# Patient Record
Sex: FEMALE | Race: WHITE | Employment: UNEMPLOYED | URBAN - METROPOLITAN AREA
[De-identification: names, ages, dates, MRNs, and addresses within clinical notes are randomized per-mention and may not be internally consistent; named-entity substitution may affect disease eponyms.]

---

## 2023-09-06 DIAGNOSIS — N32.9: Primary | ICD-10-CM

## 2023-09-07 ENCOUNTER — OFFICE VISIT (OUTPATIENT)
Dept: UROLOGY | Facility: CLINIC | Age: 58
End: 2023-09-07
Payer: COMMERCIAL

## 2023-09-07 DIAGNOSIS — N81.10 BLADDER PROLAPSE, FEMALE, ACQUIRED: Primary | ICD-10-CM

## 2023-09-07 DIAGNOSIS — N32.9: ICD-10-CM

## 2023-09-07 DIAGNOSIS — B37.9 YEAST INFECTION: ICD-10-CM

## 2023-09-07 DIAGNOSIS — N30.00 ACUTE CYSTITIS WITHOUT HEMATURIA: ICD-10-CM

## 2023-09-07 LAB
BILIRUBIN, UA POC OHS: NEGATIVE
BLOOD, UA POC OHS: NEGATIVE
CLARITY, UA POC OHS: ABNORMAL
COLOR, UA POC OHS: YELLOW
GLUCOSE, UA POC OHS: NEGATIVE
KETONES, UA POC OHS: NEGATIVE
LEUKOCYTES, UA POC OHS: ABNORMAL
NITRITE, UA POC OHS: NEGATIVE
PH, UA POC OHS: 6
PROTEIN, UA POC OHS: NEGATIVE
SPECIFIC GRAVITY, UA POC OHS: 1.01
UROBILINOGEN, UA POC OHS: 0.2

## 2023-09-07 PROCEDURE — 81003 URINALYSIS AUTO W/O SCOPE: CPT | Mod: QW,S$GLB,, | Performed by: FAMILY MEDICINE

## 2023-09-07 PROCEDURE — 99214 PR OFFICE/OUTPT VISIT, EST, LEVL IV, 30-39 MIN: ICD-10-PCS | Mod: S$GLB,,, | Performed by: FAMILY MEDICINE

## 2023-09-07 PROCEDURE — 1160F RVW MEDS BY RX/DR IN RCRD: CPT | Mod: CPTII,S$GLB,, | Performed by: FAMILY MEDICINE

## 2023-09-07 PROCEDURE — 99214 OFFICE O/P EST MOD 30 MIN: CPT | Mod: S$GLB,,, | Performed by: FAMILY MEDICINE

## 2023-09-07 PROCEDURE — 1160F PR REVIEW ALL MEDS BY PRESCRIBER/CLIN PHARMACIST DOCUMENTED: ICD-10-PCS | Mod: CPTII,S$GLB,, | Performed by: FAMILY MEDICINE

## 2023-09-07 PROCEDURE — 1159F PR MEDICATION LIST DOCUMENTED IN MEDICAL RECORD: ICD-10-PCS | Mod: CPTII,S$GLB,, | Performed by: FAMILY MEDICINE

## 2023-09-07 PROCEDURE — 81003 POCT URINALYSIS(INSTRUMENT): ICD-10-PCS | Mod: QW,S$GLB,, | Performed by: FAMILY MEDICINE

## 2023-09-07 PROCEDURE — 1159F MED LIST DOCD IN RCRD: CPT | Mod: CPTII,S$GLB,, | Performed by: FAMILY MEDICINE

## 2023-09-07 RX ORDER — ALBUTEROL SULFATE 0.83 MG/ML
SOLUTION RESPIRATORY (INHALATION)
COMMUNITY
Start: 2023-03-21

## 2023-09-07 RX ORDER — CLONAZEPAM 1 MG/1
TABLET ORAL
COMMUNITY
Start: 2023-08-16

## 2023-09-07 RX ORDER — TITANIUM DIOXIDE, OCTINOXATE, ZINC OXIDE 4.61; 1.6; .78 G/40ML; G/40ML; G/40ML
CREAM TOPICAL
COMMUNITY

## 2023-09-07 RX ORDER — CLOZAPINE 100 MG/1
TABLET ORAL
COMMUNITY
Start: 2023-08-15

## 2023-09-07 RX ORDER — FLUCONAZOLE 150 MG/1
150 TABLET ORAL DAILY
Qty: 2 TABLET | Refills: 0 | Status: SHIPPED | OUTPATIENT
Start: 2023-09-07 | End: 2023-09-09

## 2023-09-07 RX ORDER — ZOLPIDEM TARTRATE 5 MG/1
TABLET ORAL
COMMUNITY
Start: 2023-05-11

## 2023-09-07 RX ORDER — CHOLECALCIFEROL (VITAMIN D3) 25 MCG
1000 TABLET ORAL DAILY
COMMUNITY

## 2023-09-07 NOTE — PROGRESS NOTES
Subjective:       Patient ID: Loretta Thomas is a 58 y.o. female.    Chief Complaint: bladder prolapse      HPI: 58-year-old female patient presenting to the clinic to establish care for possible pelvic organ prolapse.  Patient also states that she gets frequent urinary tract infections.  Today she complains of mostly itching in the vaginal area.  She denies any dysuria.  She recently finished antibiotic for UTI.          Past Medical History:   Past Medical History:   Diagnosis Date    Bipolar disorder, unspecified     Schizophrenia, unspecified        Past Surgical Historical:   Past Surgical History:   Procedure Laterality Date    DILATION AND CURETTAGE OF UTERUS      fibroid removal      SURGICAL REMOVAL OF ENDOMETRIOSIS          Medications:   Medication List with Changes/Refills   New Medications    FLUCONAZOLE (DIFLUCAN) 150 MG TAB    Take 1 tablet (150 mg total) by mouth once daily. One by mouth now and repeat x1 on day 3 for 2 doses   Current Medications    ALBUTEROL (PROVENTIL) 2.5 MG /3 ML (0.083 %) NEBULIZER SOLUTION        CLONAZEPAM (KLONOPIN) 1 MG TABLET        CLOZAPINE (CLOZARIL) 100 MG TAB        CRANBERRY 400 MG CAP    Take by mouth.    NAPROXEN SODIUM (ALEVE ORAL)    Take by mouth.    VITAMIN D (VITAMIN D3) 1000 UNITS TAB    Take 1,000 Units by mouth once daily.    ZOLPIDEM (AMBIEN) 5 MG TAB            Past Social History:   Social History     Socioeconomic History    Marital status:    Tobacco Use    Smoking status: Never    Smokeless tobacco: Never   Substance and Sexual Activity    Alcohol use: Never    Drug use: Never       Allergies:   Review of patient's allergies indicates:   Allergen Reactions    Aspirin     Haldol [haloperidol lactate]         Family History: History reviewed. No pertinent family history.     Review of Systems:  Review of Systems   Constitutional:  Negative for activity change, appetite change, chills, diaphoresis, fatigue, fever and unexpected weight change.    HENT:  Negative for congestion, dental problem, drooling, ear discharge, ear pain, facial swelling, hearing loss, mouth sores, nosebleeds, postnasal drip, rhinorrhea, sinus pressure, sinus pain, sneezing, sore throat, tinnitus, trouble swallowing and voice change.    Eyes:  Negative for photophobia, pain, discharge, redness, itching and visual disturbance.   Respiratory:  Negative for apnea, cough, choking, chest tightness, shortness of breath, wheezing and stridor.    Cardiovascular:  Negative for chest pain and leg swelling.   Gastrointestinal:  Negative for abdominal distention, abdominal pain, anal bleeding, blood in stool, constipation, diarrhea, nausea, rectal pain and vomiting.   Endocrine: Negative for cold intolerance, heat intolerance, polydipsia, polyphagia and polyuria.   Genitourinary: Negative.  Negative for decreased urine volume, difficulty urinating, dyspareunia, dysuria, enuresis, flank pain, frequency, genital sores, hematuria, menstrual problem, pelvic pain, urgency, vaginal bleeding, vaginal discharge and vaginal pain.   Musculoskeletal:  Negative for arthralgias, back pain, gait problem, joint swelling, myalgias, neck pain and neck stiffness.   Skin:  Negative for color change, pallor, rash and wound.   Allergic/Immunologic: Negative for environmental allergies, food allergies and immunocompromised state.   Neurological:  Negative for dizziness, tremors, seizures, syncope, facial asymmetry, speech difficulty, weakness, light-headedness, numbness and headaches.   Hematological:  Negative for adenopathy. Does not bruise/bleed easily.   Psychiatric/Behavioral:  Negative for agitation, behavioral problems, confusion, decreased concentration, dysphoric mood, hallucinations, self-injury, sleep disturbance and suicidal ideas. The patient is not nervous/anxious and is not hyperactive.        Physical Exam:  Physical Exam  Exam conducted with a chaperone present.   Constitutional:       Appearance:  Normal appearance.   HENT:      Head: Normocephalic.      Nose: Nose normal.      Mouth/Throat:      Mouth: Mucous membranes are moist.      Pharynx: Oropharynx is clear.   Eyes:      Extraocular Movements: Extraocular movements intact.      Conjunctiva/sclera: Conjunctivae normal.      Pupils: Pupils are equal, round, and reactive to light.   Cardiovascular:      Rate and Rhythm: Normal rate and regular rhythm.      Pulses: Normal pulses.      Heart sounds: Normal heart sounds.   Pulmonary:      Effort: Pulmonary effort is normal.      Breath sounds: Normal breath sounds.   Abdominal:      General: Abdomen is flat. Bowel sounds are normal.      Palpations: Abdomen is soft.      Hernia: There is no hernia in the left inguinal area or right inguinal area.   Genitourinary:     General: Normal vulva.      Pubic Area: No rash or pubic lice.       Labia:         Right: No rash, tenderness, lesion or injury.         Left: No rash, tenderness, lesion or injury.       Urethra: No prolapse, urethral pain, urethral swelling or urethral lesion.      Rectum: Normal.   Musculoskeletal:         General: Normal range of motion.      Cervical back: Normal range of motion and neck supple.   Lymphadenopathy:      Lower Body: No right inguinal adenopathy. No left inguinal adenopathy.   Skin:     General: Skin is warm and dry.      Capillary Refill: Capillary refill takes less than 2 seconds.   Neurological:      General: No focal deficit present.      Mental Status: She is alert and oriented to person, place, and time. Mental status is at baseline.   Psychiatric:         Mood and Affect: Mood normal.         Behavior: Behavior normal.         Thought Content: Thought content normal.         Judgment: Judgment normal.         Assessment/Plan:     Pelvic exam done with no obvious pelvic organ prolapse at this time.  Patient has not had a hysterectomy.  She states that she feels vaginal pressure and a bulge when coughing.  We will refer  the patient to an OBGYN for further evaluation as we can not proceed with surgical intervention without hysterectomy.  Diflucan will be prescribed for vaginal yeast infection.  Urinalysis negative for infection today.  Bladder scan was 0 mL postvoid  Problem List Items Addressed This Visit    None  Visit Diagnoses       Bladder prolapse, female, acquired    -  Primary    Relevant Orders    Ambulatory referral/consult to Obstetrics / Gynecology    Problem with bladder        Yeast infection        Relevant Medications    fluconazole (DIFLUCAN) 150 MG Tab

## 2024-06-07 DIAGNOSIS — R92.8 ABNORMAL MAMMOGRAM OF LEFT BREAST: Primary | ICD-10-CM

## 2024-06-10 ENCOUNTER — OFFICE VISIT (OUTPATIENT)
Dept: SURGERY | Facility: CLINIC | Age: 59
End: 2024-06-10
Payer: COMMERCIAL

## 2024-06-10 DIAGNOSIS — N63.21 MASS OF UPPER OUTER QUADRANT OF LEFT BREAST: Primary | ICD-10-CM

## 2024-06-10 PROCEDURE — 99203 OFFICE O/P NEW LOW 30 MIN: CPT | Mod: S$GLB,,, | Performed by: SURGERY

## 2024-06-10 RX ORDER — ATORVASTATIN CALCIUM 40 MG/1
40 TABLET, FILM COATED ORAL NIGHTLY
COMMUNITY
Start: 2024-05-28

## 2024-06-10 RX ORDER — LURASIDONE HYDROCHLORIDE 60 MG/1
60 TABLET, FILM COATED ORAL DAILY
COMMUNITY
Start: 2024-05-20

## 2024-06-10 RX ORDER — BISACODYL 5 MG
5 TABLET, DELAYED RELEASE (ENTERIC COATED) ORAL DAILY PRN
COMMUNITY

## 2024-06-10 NOTE — PROGRESS NOTES
History & Physical    SUBJECTIVE:     History of Present Illness:    59-year-old female referred by Dr Hernandez for a palpable left breast lump for proximally 1 week.  She went on to have mammogram and ultrasound which showed an irregular left breast mass measuring 2.9 cm with a large focal asymmetry in the upper outer quadrant of the left breast highly suggestive of malignancy.  Ultrasound-guided biopsy is recommended.  She also had a loosely grouped area of pleomorphic calcifications in the anterior upper outer aspect of left breast that were also suspicious of malignancy and stereotactic biopsy was recommended.  No family history of breast cancer.  No previous breast biopsies.  She had 1 child and did not breastfeed.    Chief Complaint   Patient presents with    Breast Problem     Left breast         Review of patient's allergies indicates:  Review of patient's allergies indicates:   Allergen Reactions    Aspirin     Haldol [haloperidol lactate]     Tylenol [acetaminophen] Diarrhea       Current Outpatient Medications on File Prior to Visit   Medication Sig Dispense Refill    albuterol (PROVENTIL) 2.5 mg /3 mL (0.083 %) nebulizer solution       atorvastatin (LIPITOR) 40 MG tablet Take 40 mg by mouth every evening.      bisacodyL (DULCOLAX) 5 mg EC tablet Take 5 mg by mouth daily as needed for Constipation.      clonazePAM (KLONOPIN) 1 MG tablet       cloZAPine (CLOZARIL) 100 MG Tab       cranberry 400 mg Cap Take by mouth.      lurasidone (LATUDA) 60 mg Tab tablet Take 60 mg by mouth once daily.      naproxen sodium (ALEVE ORAL) Take by mouth.      vitamin D (VITAMIN D3) 1000 units Tab Take 1,000 Units by mouth once daily.      zolpidem (AMBIEN) 5 MG Tab        No current facility-administered medications on file prior to visit.       Past Medical History:   Diagnosis Date    Bipolar disorder, unspecified     Schizophrenia, unspecified      Past Surgical History:   Procedure Laterality Date    DILATION AND  CURETTAGE OF UTERUS      fibroid removal      SURGICAL REMOVAL OF ENDOMETRIOSIS       No family history on file.    Social History     Socioeconomic History    Marital status:    Tobacco Use    Smoking status: Never    Smokeless tobacco: Never   Substance and Sexual Activity    Alcohol use: Never    Drug use: Never          ROS    OBJECTIVE:     There were no vitals filed for this visit.              Physical Exam:  Physical Exam  Constitutional:       Appearance: She is obese.   HENT:      Head: Normocephalic.   Eyes:      Pupils: Pupils are equal, round, and reactive to light.   Cardiovascular:      Rate and Rhythm: Normal rate.   Chest:          Comments: Left breast: As a large area of hyperdensity in the breast tissues in the upper outer quadrant region with ill-defined margins.  This is encompasses probably about a 6 x 4 cm area.  Overlying skin normal.  Nipple areola complex normal axilla negative.  Right breast: No abnormal masses palpable.  Skin normal.  Axilla negative.  Nipple areola complex negative  Abdominal:      General: Abdomen is flat. Bowel sounds are normal.      Palpations: Abdomen is soft.   Musculoskeletal:         General: Normal range of motion.   Skin:     General: Skin is warm and dry.   Neurological:      General: No focal deficit present.      Mental Status: She is alert and oriented to person, place, and time.      Cranial Nerves: No cranial nerve deficit.   Psychiatric:         Mood and Affect: Mood normal.         Behavior: Behavior normal.             ASSESSMENT/PLAN:   Suspicious abnormalities in the left breast upper outer quadrant measuring proximally 3 cm on ultrasound as well as some pleomorphic microcalcifications that are also suspicious.  PLAN:  We will obtain ultrasound-guided core biopsy of the upper outer quadrant 2.9 cm breast mass as well as stereotactic biopsy of the calcifications.  Further treatment pending pathological diagnosis

## 2024-06-25 ENCOUNTER — TELEPHONE (OUTPATIENT)
Dept: SURGERY | Facility: CLINIC | Age: 59
End: 2024-06-25
Payer: COMMERCIAL

## 2024-06-25 DIAGNOSIS — C50.912 MALIGNANT NEOPLASM OF LEFT FEMALE BREAST, UNSPECIFIED ESTROGEN RECEPTOR STATUS, UNSPECIFIED SITE OF BREAST: Primary | ICD-10-CM

## 2024-06-25 NOTE — TELEPHONE ENCOUNTER
Patient notified of recent ultrasound and stereotactic left breast biopsy both results showing invasive lobular carcinoma, grade 1 of 3 and grade 2 of 3 respectively.  This area apparently encompasses at least about a 6 cm area in the upper outer left breast so I briefly talked to her about surgical therapy.  We need to await the receptor status to know exactly what to recommend to her at this time.  We will see her next Monday July 1st in office at 1:00 a.m. and hopefully by that time we will have the final path report with the receptors in the HER2 Roland status.

## 2024-07-01 ENCOUNTER — OFFICE VISIT (OUTPATIENT)
Dept: SURGERY | Facility: CLINIC | Age: 59
End: 2024-07-01
Payer: COMMERCIAL

## 2024-07-01 DIAGNOSIS — C50.911 LOBULAR CARCINOMA OF RIGHT BREAST: Primary | ICD-10-CM

## 2024-07-01 DIAGNOSIS — R22.2 SUBCUTANEOUS MASS OF BACK: ICD-10-CM

## 2024-07-01 LAB
ANION GAP SERPL CALC-SCNC: 6 MMOL/L (ref 3–11)
BASOPHILS NFR BLD: 0.4 % (ref 0–3)
BUN SERPL-MCNC: 20 MG/DL (ref 7–18)
BUN/CREAT SERPL: 22.47 RATIO (ref 7–18)
CALCIUM SERPL-MCNC: 8.4 MG/DL (ref 8.8–10.5)
CHLORIDE SERPL-SCNC: 102 MMOL/L (ref 100–108)
CO2 SERPL-SCNC: 33 MMOL/L (ref 21–32)
CREAT SERPL-MCNC: 0.89 MG/DL (ref 0.55–1.02)
EOSINOPHIL NFR BLD: 1 % (ref 1–3)
ERYTHROCYTE [DISTWIDTH] IN BLOOD BY AUTOMATED COUNT: 16.1 % (ref 12.5–18)
GFR ESTIMATION: > 60
GLUCOSE SERPL-MCNC: 80 MG/DL (ref 70–110)
HCT VFR BLD AUTO: 37.1 % (ref 37–47)
HGB BLD-MCNC: 11.5 G/DL (ref 12–16)
LYMPHOCYTES NFR BLD: 19.3 % (ref 25–40)
MCH RBC QN AUTO: 25.6 PG (ref 27–31.2)
MCHC RBC AUTO-ENTMCNC: 31 G/DL (ref 31.8–35.4)
MCV RBC AUTO: 82.4 FL (ref 80–97)
MONOCYTES NFR BLD: 6.9 % (ref 1–15)
NEUTROPHILS # BLD AUTO: 8.3 10*3/UL (ref 1.8–7.7)
NEUTROPHILS NFR BLD: 71.9 % (ref 37–80)
NUCLEATED RED BLOOD CELLS: 0 %
PLATELETS: 255 10*3/UL (ref 142–424)
POTASSIUM SERPL-SCNC: 4.2 MMOL/L (ref 3.6–5.2)
RBC # BLD AUTO: 4.5 10*6/UL (ref 4.2–5.4)
SODIUM BLD-SCNC: 141 MMOL/L (ref 135–145)
WBC # BLD: 11.5 10*3/UL (ref 4.6–10.2)

## 2024-07-01 PROCEDURE — 99213 OFFICE O/P EST LOW 20 MIN: CPT | Mod: S$GLB,,, | Performed by: SURGERY

## 2024-07-01 RX ORDER — LORATADINE 10 MG/1
10 TABLET ORAL DAILY
COMMUNITY

## 2024-07-01 RX ORDER — OMEPRAZOLE 20 MG/1
20 TABLET, DELAYED RELEASE ORAL DAILY
COMMUNITY

## 2024-07-01 RX ORDER — MUPIROCIN 20 MG/G
OINTMENT TOPICAL 2 TIMES DAILY
COMMUNITY
Start: 2024-06-14

## 2024-07-01 RX ORDER — ALUMINUM ZIRCONIUM OCTACHLOROHYDREX GLY 16 G/100G
GEL TOPICAL
COMMUNITY

## 2024-07-01 NOTE — PROGRESS NOTES
History & Physical    SUBJECTIVE:     History of Present Illness:    59-year-old female is here today to discuss recent left breast biopsy results.  She had 2 areas biopsied in the left upper outer quadrant of the breast.  One was a large area of asymmetry measuring about 6 cm with a about a 3 cm mass in the other was an area of microcalcifications.  Both these areas shown to be invasive lobular carcinoma.  ERPR positive HER2 negative.  She is here today to discuss treatment options.    No chief complaint on file.        Review of patient's allergies indicates:  Review of patient's allergies indicates:   Allergen Reactions    Aspirin     Haldol [haloperidol lactate]     Tylenol [acetaminophen] Diarrhea       Current Outpatient Medications on File Prior to Visit   Medication Sig Dispense Refill    mupirocin (BACTROBAN) 2 % ointment 2 (two) times daily. Apply to affected area      albuterol (PROVENTIL) 2.5 mg /3 mL (0.083 %) nebulizer solution       antiox #8/om3/dha/epa/lut/zeax (PRESERVISION AREDS 2, OMEGA-3, ORAL) Take by mouth.      atorvastatin (LIPITOR) 40 MG tablet Take 40 mg by mouth every evening.      Bifidobacterium infantis (ALIGN) 4 mg Cap Take by mouth.      bisacodyL (DULCOLAX) 5 mg EC tablet Take 5 mg by mouth daily as needed for Constipation.      clonazePAM (KLONOPIN) 1 MG tablet       cloZAPine (CLOZARIL) 100 MG Tab       cranberry 400 mg Cap Take by mouth.      loratadine (CLARITIN) 10 mg tablet Take 10 mg by mouth once daily.      lurasidone (LATUDA) 60 mg Tab tablet Take 60 mg by mouth once daily.      naproxen sodium (ALEVE ORAL) Take by mouth.      omeprazole (PRILOSEC OTC) 20 MG tablet Take 20 mg by mouth once daily.      vitamin D (VITAMIN D3) 1000 units Tab Take 1,000 Units by mouth once daily.      [DISCONTINUED] zolpidem (AMBIEN) 5 MG Tab        No current facility-administered medications on file prior to visit.       Past Medical History:   Diagnosis Date    Bipolar disorder, unspecified      Schizophrenia, unspecified      Past Surgical History:   Procedure Laterality Date    DILATION AND CURETTAGE OF UTERUS      fibroid removal      SURGICAL REMOVAL OF ENDOMETRIOSIS       No family history on file.    Social History     Socioeconomic History    Marital status:    Tobacco Use    Smoking status: Never    Smokeless tobacco: Never   Substance and Sexual Activity    Alcohol use: Never    Drug use: Never          Review of Systems   Constitutional: Negative.    Respiratory: Negative.     Cardiovascular: Negative.    Gastrointestinal: Negative.    Genitourinary: Negative.    Musculoskeletal:  Positive for back pain and joint pain.   Neurological:  Positive for weakness.   Psychiatric/Behavioral: Negative.         OBJECTIVE:     There were no vitals filed for this visit.              Physical Exam:  Physical Exam  Constitutional:       Appearance: She is obese.   HENT:      Head: Normocephalic.   Eyes:      Pupils: Pupils are equal, round, and reactive to light.   Cardiovascular:      Rate and Rhythm: Normal rate and regular rhythm.   Pulmonary:      Effort: Pulmonary effort is normal.   Chest:          Comments: Large area in the left upper outer quadrant of the breast measuring about 6 x 5 cm in size with hyperdensity in signs of previous biopsy.  Abdominal:      General: Abdomen is flat. Bowel sounds are normal.      Palpations: Abdomen is soft.   Musculoskeletal:         General: Normal range of motion.      Cervical back: Normal range of motion.   Skin:     General: Skin is warm and dry.             Comments: 5 cm subcutaneous mass right upper back consistent with lipoma.  Smooth edges and rounded   Neurological:      General: No focal deficit present.      Mental Status: She is alert and oriented to person, place, and time.   Psychiatric:         Mood and Affect: Mood normal.         Behavior: Behavior normal.             ASSESSMENT/PLAN:   1. Multifocal lobular invasive carcinoma left breast  with area of asymmetry measuring at least 6 cm.  2. Right upper back subcutaneous mass consistent with lipoma, 5 cm    PLAN:  Discussed with patient treatment options.  I do not think she is a good candidate for breast conservation surgery given the large size of the asymmetry in the left upper outer quadrant and the fact that this invasive lobular carcinomas multifocal.  My recommendation would be for a left total mastectomy with left axillary sentinel lymph node biopsy and then excision of the subcutaneous lipoma from right upper back.  She is in agreement this will be scheduled for next week

## 2024-07-11 ENCOUNTER — OUTSIDE PLACE OF SERVICE (OUTPATIENT)
Dept: SURGERY | Facility: CLINIC | Age: 59
End: 2024-07-11

## 2024-07-11 PROCEDURE — 21931 EXC BACK LES SC 3 CM/>: CPT | Mod: 51,,, | Performed by: SURGERY

## 2024-07-11 PROCEDURE — 38792 RA TRACER ID OF SENTINL NODE: CPT | Mod: XE,LT,, | Performed by: SURGERY

## 2024-07-11 PROCEDURE — 19302 P-MASTECTOMY W/LN REMOVAL: CPT | Mod: LT,,, | Performed by: SURGERY

## 2024-07-11 PROCEDURE — 38900 IO MAP OF SENT LYMPH NODE: CPT | Mod: LT,,, | Performed by: SURGERY

## 2024-07-12 ENCOUNTER — TELEPHONE (OUTPATIENT)
Dept: SURGERY | Facility: CLINIC | Age: 59
End: 2024-07-12
Payer: COMMERCIAL

## 2024-07-12 NOTE — TELEPHONE ENCOUNTER
Rx for zofran 4 mg 1 po q 6 hrs prn nausea #20 called to [facility] pharmacy EDISON Lama.@1215. Patient states that she checked with pharmacy and was informed that nothing was called in. I called the pharmacy and was told that they had nothing for the patient. Informed tech that I called in the Rx at 1215. I was put on hold so that she could check the voicemail. After a long hold, pharmacist came on and confirmed that they did have the voicemail and abruptly ended the call. Patient notified that the pharmacy did have the rx.

## 2024-07-12 NOTE — TELEPHONE ENCOUNTER
Patient called and stated that she changed her mind before she left the hospital and requested norco instead of naproxen. I spoke with Dr. Lama who states that he had a long discussion this morning with the patient who has a tylenol allergy and requested naproxen for pain. He was never notified that she changed her mind. Order received to call out Tramadol 50 mg po q 8 hrs prn pain #20. Called to [facility] [address]. Informed patient that she may want to call and inform pharmacy that rx was left on voicemail as there was an issue with this earlier today. Also discussed with patient the dangers of taking benzodiazepines and narcotics together and instructed to exercise extreme caution until she knows how they will affect her. Do not take at the same time, space at least an hour apart. Patient voiced understanding.

## 2024-07-12 NOTE — TELEPHONE ENCOUNTER
----- Message from Keshav Carcamo sent at 7/12/2024  4:47 PM CDT -----  Contact: SELF  Patient Loretta Thomas is requesting a call back regarding PAIN MEDS. Please call back at [phone]

## 2024-07-12 NOTE — TELEPHONE ENCOUNTER
----- Message from Radha Juan sent at 7/12/2024  2:26 PM CDT -----  Contact: Loretta  Patient is calling to speak with someone regarding prescription. Patient reports pharmacy has not yet received prescription for pain medication and request to confirm status. Please give patient a call back at [phone] today, if possible.    Thank you,  GH

## 2024-07-15 ENCOUNTER — TELEPHONE (OUTPATIENT)
Dept: SURGERY | Facility: CLINIC | Age: 59
End: 2024-07-15
Payer: COMMERCIAL

## 2024-07-17 ENCOUNTER — OFFICE VISIT (OUTPATIENT)
Dept: SURGERY | Facility: CLINIC | Age: 59
End: 2024-07-17
Payer: COMMERCIAL

## 2024-07-17 VITALS — WEIGHT: 293 LBS

## 2024-07-17 DIAGNOSIS — Z98.890 POST-OPERATIVE STATE: Primary | ICD-10-CM

## 2024-07-17 PROCEDURE — 99024 POSTOP FOLLOW-UP VISIT: CPT | Mod: S$GLB,,, | Performed by: SURGERY

## 2024-07-17 NOTE — PROGRESS NOTES
HPI:  Postop left total mastectomy with left axillary sentinel node biopsy and completion left axillary node dissection.  Path report has not returned yet.  MIL outputs are noted in less than 20 cc from drain 1. Which is the breast drain.  There is some leakage around the axillary drain.    PHYSICAL EXAM:  Dressings removed and incisions are clean.  The staple line is intact.  Area of the staples removed and Steri-Strips applied.  The medial drain is removed and it appears to be clogged.  We will leave the axillary drain in.  ASSESSMENT:    Stable postop with path report pending  PLAN:      Have patient return on Monday for removal of the 2nd drain and removal of remaining staples.  Hopefully path report will be back by then.

## 2024-07-22 ENCOUNTER — TELEPHONE (OUTPATIENT)
Dept: HEMATOLOGY/ONCOLOGY | Facility: CLINIC | Age: 59
End: 2024-07-22
Payer: COMMERCIAL

## 2024-07-22 ENCOUNTER — OFFICE VISIT (OUTPATIENT)
Dept: SURGERY | Facility: CLINIC | Age: 59
End: 2024-07-22
Payer: COMMERCIAL

## 2024-07-22 DIAGNOSIS — Z98.890 POST-OPERATIVE STATE: Primary | ICD-10-CM

## 2024-07-22 DIAGNOSIS — C50.911 LOBULAR CARCINOMA OF RIGHT BREAST: ICD-10-CM

## 2024-07-22 PROCEDURE — 99024 POSTOP FOLLOW-UP VISIT: CPT | Mod: S$GLB,,, | Performed by: SURGERY

## 2024-07-22 NOTE — PROGRESS NOTES
HPI:  Second postop revisit status post left total mastectomy with left axillary node dissection.  Final path report shows 3 of 15 lymph nodes positive for metastasis.  Tumor size almost 9 cm.  Invasive lobular carcinoma.    PHYSICAL EXAM:  Remaining staples were removed.  Remaining Ryan-Murrell drain removed.  Incision re Steri-Strips.  ASSESSMENT:    Stable postop  PLAN:      Refill tramadol.  Referral to Medical Oncology.

## 2024-07-23 ENCOUNTER — TELEPHONE (OUTPATIENT)
Dept: SURGERY | Facility: CLINIC | Age: 59
End: 2024-07-23
Payer: COMMERCIAL

## 2024-07-25 DIAGNOSIS — C50.919 MALIGNANT NEOPLASM OF BREAST IN FEMALE, ESTROGEN RECEPTOR POSITIVE, UNSPECIFIED LATERALITY, UNSPECIFIED SITE OF BREAST: Primary | ICD-10-CM

## 2024-07-25 DIAGNOSIS — Z17.0 MALIGNANT NEOPLASM OF BREAST IN FEMALE, ESTROGEN RECEPTOR POSITIVE, UNSPECIFIED LATERALITY, UNSPECIFIED SITE OF BREAST: Primary | ICD-10-CM

## 2024-07-25 NOTE — PROGRESS NOTES
Patient discussed in TMB  Will need port placement for adjuvant chemotherapy.  She will be seen by Medical Oncology in 2 weeks.

## 2024-07-26 RX ORDER — NAPROXEN SODIUM 550 MG/1
TABLET ORAL
COMMUNITY
Start: 2024-07-12

## 2024-07-26 RX ORDER — NAPROXEN SODIUM 550 MG/1
TABLET ORAL
Status: CANCELLED | OUTPATIENT
Start: 2024-07-26

## 2024-07-31 ENCOUNTER — OFFICE VISIT (OUTPATIENT)
Dept: SURGERY | Facility: CLINIC | Age: 59
End: 2024-07-31
Payer: COMMERCIAL

## 2024-07-31 VITALS — WEIGHT: 293 LBS | HEIGHT: 64 IN | BODY MASS INDEX: 50.02 KG/M2

## 2024-07-31 DIAGNOSIS — Z17.0 MALIGNANT NEOPLASM OF BREAST IN FEMALE, ESTROGEN RECEPTOR POSITIVE, UNSPECIFIED LATERALITY, UNSPECIFIED SITE OF BREAST: ICD-10-CM

## 2024-07-31 DIAGNOSIS — C50.919 MALIGNANT NEOPLASM OF BREAST IN FEMALE, ESTROGEN RECEPTOR POSITIVE, UNSPECIFIED LATERALITY, UNSPECIFIED SITE OF BREAST: ICD-10-CM

## 2024-07-31 DIAGNOSIS — Z98.890 POST-OPERATIVE STATE: Primary | ICD-10-CM

## 2024-07-31 PROCEDURE — 99024 POSTOP FOLLOW-UP VISIT: CPT | Mod: S$GLB,,, | Performed by: SURGERY

## 2024-07-31 NOTE — PROGRESS NOTES
HPI:  Status post left modified radical mastectomy with left axillary node dissection.  Patient called this morning and came in this afternoon due to possible infection along the lateral aspect of her incision.  She noticed a little purulent drainage from the incision line laterally.  This was in an area of some epidermolysis and skin slough following the mastectomy.    PHYSICAL EXAM:  Examination of the area shows some fibrinous exudate and a 4 x 1 cm area of some epidermolysis along the lateral aspect of the incision line.  It appears to be rather superficial.  ASSESSMENT:    Superficial incisional skin necrosis laterally in the mastectomy incision  PLAN:      We will put on Bactrim b.i.d. for 1 week.  Also advised cleaning incision with peroxide or Betadine and applying antibiotic ointment to the lateral aspect of the incision line.  We discussed what to watch for any worsening to please call so that I can recheck it.

## 2024-08-08 ENCOUNTER — OFFICE VISIT (OUTPATIENT)
Dept: HEMATOLOGY/ONCOLOGY | Facility: CLINIC | Age: 59
End: 2024-08-08
Payer: COMMERCIAL

## 2024-08-08 VITALS
HEIGHT: 64 IN | BODY MASS INDEX: 49.94 KG/M2 | SYSTOLIC BLOOD PRESSURE: 131 MMHG | HEART RATE: 115 BPM | RESPIRATION RATE: 16 BRPM | WEIGHT: 292.5 LBS | OXYGEN SATURATION: 93 % | DIASTOLIC BLOOD PRESSURE: 78 MMHG

## 2024-08-08 DIAGNOSIS — C50.911 LOBULAR CARCINOMA OF RIGHT BREAST: ICD-10-CM

## 2024-08-08 DIAGNOSIS — Z17.0 MALIGNANT NEOPLASM OF OVERLAPPING SITES OF LEFT BREAST IN FEMALE, ESTROGEN RECEPTOR POSITIVE: Primary | ICD-10-CM

## 2024-08-08 DIAGNOSIS — Z98.890 POST-OPERATIVE STATE: ICD-10-CM

## 2024-08-08 DIAGNOSIS — C50.812 MALIGNANT NEOPLASM OF OVERLAPPING SITES OF LEFT BREAST IN FEMALE, ESTROGEN RECEPTOR POSITIVE: Primary | ICD-10-CM

## 2024-08-09 ENCOUNTER — TELEPHONE (OUTPATIENT)
Dept: HEMATOLOGY/ONCOLOGY | Facility: CLINIC | Age: 59
End: 2024-08-09
Payer: COMMERCIAL

## 2024-08-22 ENCOUNTER — TELEPHONE (OUTPATIENT)
Dept: SURGERY | Facility: CLINIC | Age: 59
End: 2024-08-22
Payer: COMMERCIAL

## 2024-08-22 NOTE — TELEPHONE ENCOUNTER
----- Message from Edna Pantoja sent at 8/21/2024  3:50 PM CDT -----  Contact: pt  Pt calling about test results and she can be reached at [phone].  Pt wants to know to if she needs to keep apt w/Aryan Cooper,    Stated to pt to keep appointment and if Dr. Lama says anything about test results I will contact pt and let her know

## 2024-08-28 ENCOUNTER — TELEPHONE (OUTPATIENT)
Dept: HEMATOLOGY/ONCOLOGY | Facility: CLINIC | Age: 59
End: 2024-08-28
Payer: COMMERCIAL

## 2024-09-05 ENCOUNTER — OFFICE VISIT (OUTPATIENT)
Dept: HEMATOLOGY/ONCOLOGY | Facility: CLINIC | Age: 59
End: 2024-09-05
Payer: COMMERCIAL

## 2024-09-05 VITALS
WEIGHT: 293 LBS | BODY MASS INDEX: 50.02 KG/M2 | RESPIRATION RATE: 16 BRPM | DIASTOLIC BLOOD PRESSURE: 56 MMHG | SYSTOLIC BLOOD PRESSURE: 111 MMHG | HEIGHT: 64 IN | HEART RATE: 117 BPM | OXYGEN SATURATION: 91 %

## 2024-09-05 DIAGNOSIS — C50.911 LOBULAR CARCINOMA OF RIGHT BREAST: ICD-10-CM

## 2024-09-05 DIAGNOSIS — Z17.0 MALIGNANT NEOPLASM OF BREAST IN FEMALE, ESTROGEN RECEPTOR POSITIVE, UNSPECIFIED LATERALITY, UNSPECIFIED SITE OF BREAST: ICD-10-CM

## 2024-09-05 DIAGNOSIS — Z17.0 MALIGNANT NEOPLASM OF OVERLAPPING SITES OF LEFT BREAST IN FEMALE, ESTROGEN RECEPTOR POSITIVE: Primary | ICD-10-CM

## 2024-09-05 DIAGNOSIS — C50.919 MALIGNANT NEOPLASM OF BREAST IN FEMALE, ESTROGEN RECEPTOR POSITIVE, UNSPECIFIED LATERALITY, UNSPECIFIED SITE OF BREAST: ICD-10-CM

## 2024-09-05 DIAGNOSIS — C50.812 MALIGNANT NEOPLASM OF OVERLAPPING SITES OF LEFT BREAST IN FEMALE, ESTROGEN RECEPTOR POSITIVE: Primary | ICD-10-CM

## 2024-09-05 DIAGNOSIS — Z98.890 POST-OPERATIVE STATE: ICD-10-CM

## 2024-09-05 NOTE — PROGRESS NOTES
MEDICAL ONCOLOGY FOLLOW UP CONSULTATION NOTE    Patient ID: Loretta Thomas is a 59 y.o. female.    Chief Complaint: Breast Cancer    HPI:  Patient is a 59-year-old female with past medical history of bipolar disorder, schizophrenia who recently felt a left breast and armpit soreness and underwent evaluation followed by imaging which showed concern for a suspicious mass.  Patient underwent biopsy of the mass which showed findings consistent with invasive lobular cancer.  She was evaluated by surgery and underwent left modified radical mastectomy with axillary lymph node evaluation which showed findings as below.  Please see detailed pathology reports below. Patient reports skin wound in the process of healing but has been placed on Bactrim at this time which she is taking with improvement in her wound.  She presents to our clinic today for further evaluation.  Voices no acute complaints    Pathology:  07/11/2024    B.  Left axillary sentinel lymph node:    Positive for metastatic carcinoma (2/2)    Number of lymph nodes with tumor: 2  Number of lymph nodes with macro metastasis: 2      C.  Left breast:  Invasive lobular carcinoma, grade 1, resection of  Metastatic carcinoma to 1 of 13 lymph node  Phenotype: Luminal B like    Mitotic rate: Score 1 of 3  Overall grade:  Grade 1 of 3 (score 5 of 9)  Nuclear pleomorphism: Score 1 of 3  Tumor size: 8.9 x 7.9 x 3.2 cm  Tumor focality: Single focus of invasive carcinoma    Pathologic staging: PT3 pN1a    HER2 Roland negative  ER positive  RI positive  Ki 67:16%  P 53: 6%            Past Medical History:   Diagnosis Date    Allergy     Anxiety     Arthritis     Bipolar disorder, unspecified     Breast cancer     Depression     GERD (gastroesophageal reflux disease)     History of left mastectomy 07/11/2024    Schizophrenia, unspecified     Seizures     Skin cancer      Family History   Problem Relation Name Age of Onset    No Known Problems Mother      No Known Problems Father       Hyperlipidemia Paternal Grandmother       Social History     Socioeconomic History    Marital status:    Tobacco Use    Smoking status: Never    Smokeless tobacco: Never   Substance and Sexual Activity    Alcohol use: Never    Drug use: Never     Past Surgical History:   Procedure Laterality Date    DILATION AND CURETTAGE OF UTERUS      fibroid removal      MASTECTOMY Left 07/11/2024    SURGICAL REMOVAL OF ENDOMETRIOSIS           Review of systems:  Review of Systems   Constitutional:  Negative for activity change, appetite change, chills, diaphoresis, fatigue and unexpected weight change.   HENT:  Negative for congestion, facial swelling, hearing loss, mouth sores, trouble swallowing and voice change.    Eyes:  Negative for photophobia, pain, discharge and itching.   Respiratory:  Negative for apnea, cough, choking, chest tightness and shortness of breath.    Cardiovascular:  Negative for chest pain, palpitations and leg swelling.   Gastrointestinal:  Negative for abdominal distention, abdominal pain, anal bleeding and blood in stool.   Endocrine: Negative for cold intolerance, heat intolerance, polydipsia and polyphagia.   Genitourinary:  Negative for difficulty urinating, dysuria, flank pain and hematuria.   Musculoskeletal:  Negative for arthralgias, back pain, joint swelling, myalgias, neck pain and neck stiffness.        Positive for breast mas.  Status post left mastectomy: See HPI   Skin:  Negative for color change, pallor and wound.   Allergic/Immunologic: Negative for environmental allergies, food allergies and immunocompromised state.   Neurological:  Negative for dizziness, seizures, facial asymmetry, speech difficulty, light-headedness, numbness and headaches.   Hematological:  Negative for adenopathy. Does not bruise/bleed easily.   Psychiatric/Behavioral:  Negative for agitation, behavioral problems, confusion, decreased concentration and sleep disturbance.                  Physical  Exam  Vitals and nursing note reviewed.   Constitutional:       General: She is not in acute distress.     Appearance: Normal appearance. She is not ill-appearing.   HENT:      Head: Normocephalic and atraumatic.      Nose: No congestion or rhinorrhea.   Eyes:      General: No scleral icterus.     Extraocular Movements: Extraocular movements intact.      Pupils: Pupils are equal, round, and reactive to light.   Cardiovascular:      Rate and Rhythm: Normal rate and regular rhythm.      Pulses: Normal pulses.      Heart sounds: Normal heart sounds. No murmur heard.     No gallop.   Pulmonary:      Effort: Pulmonary effort is normal. No respiratory distress.      Breath sounds: Normal breath sounds. No stridor. No wheezing or rhonchi.   Abdominal:      General: Bowel sounds are normal. There is no distension.      Palpations: There is no mass.      Tenderness: There is no abdominal tenderness. There is no guarding.   Musculoskeletal:         General: No swelling, tenderness, deformity or signs of injury. Normal range of motion.      Cervical back: Normal range of motion and neck supple. No rigidity. No muscular tenderness.      Right lower leg: No edema.      Left lower leg: No edema.      Comments: Status post left mastectomy and axillary lymph node evaluation.  Healing well   Skin:     General: Skin is warm.      Coloration: Skin is not jaundiced or pale.      Findings: No bruising or lesion.   Neurological:      General: No focal deficit present.      Mental Status: She is alert and oriented to person, place, and time.      Cranial Nerves: No cranial nerve deficit.      Sensory: No sensory deficit.      Motor: No weakness.      Gait: Gait normal.   Psychiatric:         Mood and Affect: Mood normal.         Behavior: Behavior normal.         Thought Content: Thought content normal.       Vitals:    09/05/24 1417   BP: (!) 111/56   Pulse: (!) 117   Resp: 16   Body surface area is 2.46 meters  squared.    Assessment/Plan:      ECOG 1    Invasive lobular carcinoma arising from left breast in female:    == ER/AR positive HER2 Roland negative.  Status post left radical mastectomy and axillary lymph node dissection  == p T3 pN1aM0, Stage IIA  == Patient would likely need adjuvant chemotherapy considering overall size and stage.  I discussed with her about NCCN guidelines regarding invasive lobular carcinoma which has better prognosis than other types.  Will recommend Oncotype DX on tumor specimen at this time and based on recurrence score risk will discuss adjuvant chemotherapy once the results are back.    == 9/5/24:  DX revealed a low recurrence risk score of 10 with absolute group average chemotherapy benefit of <1%.  Due to this no chemotherapy will be offered and patient can proceed with radiation if inidcated.  Post radiation we will place patient on hormone blockers.    Plan:    Referral to Rad-onc      Return to clinic in 6 weeks for MD visit     Total time spent in counseling and discussion about further management options including relevant lab work, treatment,  prognosis, medications and intended side effects was more than 60 minutes. More than 50% of the time was spent on counseling and coordination of care.  I spent a total of 60 minutes on the day of the visit.This includes face to face time and non-face to face time preparing to see the patient (eg, review of tests), Obtaining and/or reviewing separately obtained history, Documenting clinical information in the electronic or other health record, Independently interpreting resultsand communicating results to the patient/family/caregiver, or Care coordination.

## 2024-09-10 DIAGNOSIS — Z17.0 MALIGNANT NEOPLASM OF OVERLAPPING SITES OF LEFT BREAST IN FEMALE, ESTROGEN RECEPTOR POSITIVE: Primary | ICD-10-CM

## 2024-09-10 DIAGNOSIS — C50.812 MALIGNANT NEOPLASM OF OVERLAPPING SITES OF LEFT BREAST IN FEMALE, ESTROGEN RECEPTOR POSITIVE: Primary | ICD-10-CM

## 2024-09-25 ENCOUNTER — TELEPHONE (OUTPATIENT)
Dept: HEMATOLOGY/ONCOLOGY | Facility: CLINIC | Age: 59
End: 2024-09-25
Payer: COMMERCIAL

## 2024-09-25 NOTE — TELEPHONE ENCOUNTER
----- Message from Puja Benjamin sent at 9/25/2024  3:07 PM CDT -----  Contact: self  Patient is requesting a call back regarding rescheduling appt on 9/26. Please call back at [phone]  
Detail Level: Detailed
Detail Level: Zone
Detail Level: Generalized

## 2024-10-15 ENCOUNTER — OFFICE VISIT (OUTPATIENT)
Dept: HEMATOLOGY/ONCOLOGY | Facility: CLINIC | Age: 59
End: 2024-10-15
Payer: COMMERCIAL

## 2024-10-15 ENCOUNTER — TELEPHONE (OUTPATIENT)
Dept: HEMATOLOGY/ONCOLOGY | Facility: CLINIC | Age: 59
End: 2024-10-15

## 2024-10-15 VITALS
RESPIRATION RATE: 16 BRPM | HEART RATE: 118 BPM | DIASTOLIC BLOOD PRESSURE: 69 MMHG | BODY MASS INDEX: 50.02 KG/M2 | SYSTOLIC BLOOD PRESSURE: 102 MMHG | WEIGHT: 293 LBS | OXYGEN SATURATION: 93 % | HEIGHT: 64 IN

## 2024-10-15 DIAGNOSIS — C50.812 MALIGNANT NEOPLASM OF OVERLAPPING SITES OF LEFT BREAST IN FEMALE, ESTROGEN RECEPTOR POSITIVE: Primary | ICD-10-CM

## 2024-10-15 DIAGNOSIS — Z17.0 MALIGNANT NEOPLASM OF OVERLAPPING SITES OF LEFT BREAST IN FEMALE, ESTROGEN RECEPTOR POSITIVE: Primary | ICD-10-CM

## 2024-10-15 DIAGNOSIS — C50.911 LOBULAR CARCINOMA OF RIGHT BREAST: ICD-10-CM

## 2024-10-15 PROCEDURE — 99215 OFFICE O/P EST HI 40 MIN: CPT | Mod: S$PBB,,, | Performed by: INTERNAL MEDICINE

## 2024-10-15 RX ORDER — LETROZOLE 2.5 MG/1
2.5 TABLET, FILM COATED ORAL DAILY
Qty: 90 TABLET | Refills: 6 | Status: SHIPPED | OUTPATIENT
Start: 2024-10-15 | End: 2025-01-13

## 2024-10-15 NOTE — TELEPHONE ENCOUNTER
Faxed prescription order for sleeve to [facility] fax [phone] and referral to occupational therapy Basilia Nunez  fax # [phone].

## 2024-10-15 NOTE — PROGRESS NOTES
MEDICAL ONCOLOGY FOLLOW UP CONSULTATION NOTE    Patient ID: Loretta Thomas is a 59 y.o. female.    Chief Complaint: Breast Cancer    HPI:  Patient is a 59-year-old female with past medical history of bipolar disorder, schizophrenia who recently felt a left breast and armpit soreness and underwent evaluation followed by imaging which showed concern for a suspicious mass.  Patient underwent biopsy of the mass which showed findings consistent with invasive lobular cancer.  She was evaluated by surgery and underwent left modified radical mastectomy with axillary lymph node evaluation which showed findings as below.  Please see detailed pathology reports below. Patient reports skin wound in the process of healing but has been placed on Bactrim at this time which she is taking with improvement in her wound.  She presents to our clinic today for further evaluation.  Voices no acute complaints    Pathology:  07/11/2024    B.  Left axillary sentinel lymph node:    Positive for metastatic carcinoma (2/2)    Number of lymph nodes with tumor: 2  Number of lymph nodes with macro metastasis: 2      C.  Left breast:  Invasive lobular carcinoma, grade 1, resection of  Metastatic carcinoma to 1 of 13 lymph node  Phenotype: Luminal B like    Mitotic rate: Score 1 of 3  Overall grade:  Grade 1 of 3 (score 5 of 9)  Nuclear pleomorphism: Score 1 of 3  Tumor size: 8.9 x 7.9 x 3.2 cm  Tumor focality: Single focus of invasive carcinoma    Pathologic staging: PT3 pN1a    HER2 Roland negative  ER positive  VA positive  Ki 67:16%  P 53: 6%            Past Medical History:   Diagnosis Date    Allergy     Anxiety     Arthritis     Bipolar disorder, unspecified     Breast cancer     Depression     GERD (gastroesophageal reflux disease)     History of left mastectomy 07/11/2024    Schizophrenia, unspecified     Seizures     Skin cancer      Family History   Problem Relation Name Age of Onset    No Known Problems Mother      No Known Problems Father       Hyperlipidemia Paternal Grandmother       Social History     Socioeconomic History    Marital status:    Tobacco Use    Smoking status: Never    Smokeless tobacco: Never   Substance and Sexual Activity    Alcohol use: Never    Drug use: Never     Past Surgical History:   Procedure Laterality Date    DILATION AND CURETTAGE OF UTERUS      fibroid removal      MASTECTOMY Left 07/11/2024    SURGICAL REMOVAL OF ENDOMETRIOSIS           Review of systems:  Review of Systems   Constitutional:  Negative for activity change, appetite change, chills, diaphoresis, fatigue and unexpected weight change.   HENT:  Negative for congestion, facial swelling, hearing loss, mouth sores, trouble swallowing and voice change.    Eyes:  Negative for photophobia, pain, discharge and itching.   Respiratory:  Negative for apnea, cough, choking, chest tightness and shortness of breath.    Cardiovascular:  Negative for chest pain, palpitations and leg swelling.   Gastrointestinal:  Negative for abdominal distention, abdominal pain, anal bleeding and blood in stool.   Endocrine: Negative for cold intolerance, heat intolerance, polydipsia and polyphagia.   Genitourinary:  Negative for difficulty urinating, dysuria, flank pain and hematuria.   Musculoskeletal:  Negative for arthralgias, back pain, joint swelling, myalgias, neck pain and neck stiffness.        Positive for breast mas.  Status post left mastectomy: See HPI   Skin:  Negative for color change, pallor and wound.   Allergic/Immunologic: Negative for environmental allergies, food allergies and immunocompromised state.   Neurological:  Negative for dizziness, seizures, facial asymmetry, speech difficulty, light-headedness, numbness and headaches.   Hematological:  Negative for adenopathy. Does not bruise/bleed easily.   Psychiatric/Behavioral:  Negative for agitation, behavioral problems, confusion, decreased concentration and sleep disturbance.                  Physical  Exam  Vitals and nursing note reviewed.   Constitutional:       General: She is not in acute distress.     Appearance: Normal appearance. She is not ill-appearing.   HENT:      Head: Normocephalic and atraumatic.      Nose: No congestion or rhinorrhea.   Eyes:      General: No scleral icterus.     Extraocular Movements: Extraocular movements intact.      Pupils: Pupils are equal, round, and reactive to light.   Cardiovascular:      Rate and Rhythm: Normal rate and regular rhythm.      Pulses: Normal pulses.      Heart sounds: Normal heart sounds. No murmur heard.     No gallop.   Pulmonary:      Effort: Pulmonary effort is normal. No respiratory distress.      Breath sounds: Normal breath sounds. No stridor. No wheezing or rhonchi.   Abdominal:      General: Bowel sounds are normal. There is no distension.      Palpations: There is no mass.      Tenderness: There is no abdominal tenderness. There is no guarding.   Musculoskeletal:         General: No swelling, tenderness, deformity or signs of injury. Normal range of motion.      Cervical back: Normal range of motion and neck supple. No rigidity. No muscular tenderness.      Right lower leg: No edema.      Left lower leg: No edema.      Comments: Status post left mastectomy and axillary lymph node evaluation.  Left arm lymphedema   Skin:     General: Skin is warm.      Coloration: Skin is not jaundiced or pale.      Findings: No bruising or lesion.   Neurological:      General: No focal deficit present.      Mental Status: She is alert and oriented to person, place, and time.      Cranial Nerves: No cranial nerve deficit.      Sensory: No sensory deficit.      Motor: No weakness.      Gait: Gait normal.   Psychiatric:         Mood and Affect: Mood normal.         Behavior: Behavior normal.         Thought Content: Thought content normal.       Vitals:    10/15/24 1407   BP: 102/69   Pulse: (!) 118   Resp: 16   Body surface area is 2.45 meters  squared.    Assessment/Plan:      ECOG 1    Invasive lobular carcinoma arising from left breast in female:    == ER/AR positive HER2 Roland negative.  Status post left radical mastectomy and axillary lymph node dissection  == p T3 pN1aM0, Stage IIA  == Patient would likely need adjuvant chemotherapy considering overall size and stage.  I discussed with her about NCCN guidelines regarding invasive lobular carcinoma which has better prognosis than other types.  Will recommend Oncotype DX on tumor specimen at this time and based on recurrence score risk will discuss adjuvant chemotherapy once the results are back.    == 9/5/24:  DX revealed a low recurrence risk score of 10 with absolute group average chemotherapy benefit of <1%.  Due to this no chemotherapy will be offered and patient can proceed with radiation if inidcated.  Post radiation we will place patient on hormone blockers.  == 10/15/24:  DEXA bone density showed no evidence of osteopenia.  Patient has not completed her radiation yet but has been evaluated by Radiation Oncology.  Post radiation patient will be placed on hormone blockers with letrozole.  Advised to take calcium and vitamin-D.  Also noted to have lymphedema in the left extremity referral to OT placed    Plan:  Keep upcoming follow-up with radiation oncology  Referral for lymphedema sleeve and occupation therapy    Return to clinic in 8 weeks for MD visit with CBC with diff and CMP prior    Total time spent in counseling and discussion about further management options including relevant lab work, treatment,  prognosis, medications and intended side effects was more than 60 minutes. More than 50% of the time was spent on counseling and coordination of care.  I spent a total of 60 minutes on the day of the visit.This includes face to face time and non-face to face time preparing to see the patient (eg, review of tests), Obtaining and/or reviewing separately obtained history, Documenting clinical  information in the electronic or other health record, Independently interpreting resultsand communicating results to the patient/family/caregiver, or Care coordination.

## 2024-12-02 ENCOUNTER — TELEPHONE (OUTPATIENT)
Dept: HEMATOLOGY/ONCOLOGY | Facility: CLINIC | Age: 59
End: 2024-12-02
Payer: COMMERCIAL

## 2024-12-02 NOTE — TELEPHONE ENCOUNTER
----- Message from Puja sent at 12/2/2024  1:12 PM CST -----  Contact: self  Patient is requesting a call back regarding lab appt - asking for a later time (leave vm if no answer). Please call back at [phone]  
Name band;

## 2024-12-09 LAB
ALBUMIN SERPL BCP-MCNC: 3.2 G/DL (ref 3.4–5)
ALBUMIN/GLOBULIN RATIO: 0.8 RATIO (ref 1.1–1.8)
ALP SERPL-CCNC: 94 U/L (ref 46–116)
ALT SERPL W P-5'-P-CCNC: 14 U/L (ref 12–78)
ANION GAP SERPL CALC-SCNC: 5 MMOL/L (ref 3–11)
AST SERPL-CCNC: 11 U/L (ref 15–37)
BANDS: 1 % (ref 0–5)
BILIRUB SERPL-MCNC: 0.3 MG/DL (ref 0–1)
BUN SERPL-MCNC: 20 MG/DL (ref 7–18)
BUN/CREAT SERPL: 20 RATIO (ref 7–18)
CALCIUM SERPL-MCNC: 9.5 MG/DL (ref 8.8–10.5)
CELLS COUNTED: 100
CHLORIDE SERPL-SCNC: 103 MMOL/L (ref 100–108)
CO2 SERPL-SCNC: 32 MMOL/L (ref 21–32)
CREAT SERPL-MCNC: 1 MG/DL (ref 0.55–1.02)
EOSINOPHIL NFR BLD: 1 % (ref 1–3)
ERYTHROCYTE [DISTWIDTH] IN BLOOD BY AUTOMATED COUNT: 15.8 % (ref 12.5–18)
GFR ESTIMATION: 64
GLOBULIN: 4 G/DL (ref 2.3–3.5)
GLUCOSE SERPL-MCNC: 180 MG/DL (ref 70–110)
HCT VFR BLD AUTO: 38.7 % (ref 37–47)
HGB BLD-MCNC: 12.6 G/DL (ref 12–16)
LYMPHOCYTES NFR BLD: 9 % (ref 25–40)
MCH RBC QN AUTO: 27.3 PG (ref 27–31.2)
MCHC RBC AUTO-ENTMCNC: 32.6 G/DL (ref 31.8–35.4)
MCV RBC AUTO: 83.9 FL (ref 80–97)
MONOCYTES NFR BLD: 6 % (ref 1–15)
NEUTROPHILS # BLD AUTO: 7.1 10*3/UL (ref 1.8–7.7)
NEUTROPHILS NFR BLD: 83 % (ref 37–80)
NUCLEATED RED BLOOD CELLS: 0 %
PLATELETS: 220 10*3/UL (ref 142–424)
POTASSIUM SERPL-SCNC: 3.8 MMOL/L (ref 3.6–5.2)
PROT SERPL-MCNC: 7.2 G/DL (ref 6.4–8.2)
RBC # BLD AUTO: 4.61 10*6/UL (ref 4.2–5.4)
RBC MORPH BLD: ABNORMAL
SMALL PLATELETS BLD QL SMEAR: ADEQUATE
SODIUM BLD-SCNC: 140 MMOL/L (ref 135–145)
WBC # BLD: 8.5 10*3/UL (ref 4.6–10.2)

## 2024-12-10 ENCOUNTER — OFFICE VISIT (OUTPATIENT)
Dept: HEMATOLOGY/ONCOLOGY | Facility: CLINIC | Age: 59
End: 2024-12-10
Payer: COMMERCIAL

## 2024-12-10 VITALS
HEIGHT: 64 IN | SYSTOLIC BLOOD PRESSURE: 118 MMHG | BODY MASS INDEX: 50.02 KG/M2 | DIASTOLIC BLOOD PRESSURE: 66 MMHG | OXYGEN SATURATION: 95 % | WEIGHT: 293 LBS | HEART RATE: 121 BPM | RESPIRATION RATE: 16 BRPM

## 2024-12-10 DIAGNOSIS — Z17.0 MALIGNANT NEOPLASM OF OVERLAPPING SITES OF LEFT BREAST IN FEMALE, ESTROGEN RECEPTOR POSITIVE: Primary | ICD-10-CM

## 2024-12-10 DIAGNOSIS — C50.812 MALIGNANT NEOPLASM OF OVERLAPPING SITES OF LEFT BREAST IN FEMALE, ESTROGEN RECEPTOR POSITIVE: Primary | ICD-10-CM

## 2024-12-10 PROCEDURE — 99214 OFFICE O/P EST MOD 30 MIN: CPT | Mod: S$PBB,,, | Performed by: INTERNAL MEDICINE

## 2024-12-10 NOTE — PROGRESS NOTES
MEDICAL ONCOLOGY FOLLOW UP CONSULTATION NOTE    Patient ID: Loretta Thomas is a 59 y.o. female.    Chief Complaint: Breast Cancer    HPI:  Patient is a 59-year-old female with past medical history of bipolar disorder, schizophrenia who recently felt a left breast and armpit soreness and underwent evaluation followed by imaging which showed concern for a suspicious mass.  Patient underwent biopsy of the mass which showed findings consistent with invasive lobular cancer.  She was evaluated by surgery and underwent left modified radical mastectomy with axillary lymph node evaluation which showed findings as below.  Please see detailed pathology reports below. Patient reports skin wound in the process of healing but has been placed on Bactrim at this time which she is taking with improvement in her wound.  She presents to our clinic today for further evaluation.  Voices no acute complaints    Pathology:  07/11/2024    B.  Left axillary sentinel lymph node:    Positive for metastatic carcinoma (2/2)    Number of lymph nodes with tumor: 2  Number of lymph nodes with macro metastasis: 2      C.  Left breast:  Invasive lobular carcinoma, grade 1, resection of  Metastatic carcinoma to 1 of 13 lymph node  Phenotype: Luminal B like    Mitotic rate: Score 1 of 3  Overall grade:  Grade 1 of 3 (score 5 of 9)  Nuclear pleomorphism: Score 1 of 3  Tumor size: 8.9 x 7.9 x 3.2 cm  Tumor focality: Single focus of invasive carcinoma    Pathologic staging: PT3 pN1a    HER2 Roland negative  ER positive  GA positive  Ki 67:16%  P 53: 6%            Past Medical History:   Diagnosis Date    Allergy     Anxiety     Arthritis     Bipolar disorder, unspecified     Breast cancer     Depression     GERD (gastroesophageal reflux disease)     History of left mastectomy 07/11/2024    Schizophrenia, unspecified     Seizures     Skin cancer      Family History   Problem Relation Name Age of Onset    No Known Problems Mother      No Known Problems Father       Hyperlipidemia Paternal Grandmother       Social History     Socioeconomic History    Marital status:    Tobacco Use    Smoking status: Never    Smokeless tobacco: Never   Substance and Sexual Activity    Alcohol use: Never    Drug use: Never     Past Surgical History:   Procedure Laterality Date    DILATION AND CURETTAGE OF UTERUS      fibroid removal      MASTECTOMY Left 07/11/2024    SURGICAL REMOVAL OF ENDOMETRIOSIS           Review of systems:  Review of Systems   Constitutional:  Negative for activity change, appetite change, chills, diaphoresis, fatigue and unexpected weight change.   HENT:  Negative for congestion, facial swelling, hearing loss, mouth sores, trouble swallowing and voice change.    Eyes:  Negative for photophobia, pain, discharge and itching.   Respiratory:  Negative for apnea, cough, choking, chest tightness and shortness of breath.    Cardiovascular:  Negative for chest pain, palpitations and leg swelling.   Gastrointestinal:  Negative for abdominal distention, abdominal pain, anal bleeding and blood in stool.   Endocrine: Negative for cold intolerance, heat intolerance, polydipsia and polyphagia.   Genitourinary:  Negative for difficulty urinating, dysuria, flank pain and hematuria.   Musculoskeletal:  Negative for arthralgias, back pain, joint swelling, myalgias, neck pain and neck stiffness.        Positive for breast mas.  Status post left mastectomy: See HPI   Skin:  Negative for color change, pallor and wound.   Allergic/Immunologic: Negative for environmental allergies, food allergies and immunocompromised state.   Neurological:  Negative for dizziness, seizures, facial asymmetry, speech difficulty, light-headedness, numbness and headaches.   Hematological:  Negative for adenopathy. Does not bruise/bleed easily.   Psychiatric/Behavioral:  Negative for agitation, behavioral problems, confusion, decreased concentration and sleep disturbance.                  Physical  Exam  Vitals and nursing note reviewed.   Constitutional:       General: She is not in acute distress.     Appearance: Normal appearance. She is not ill-appearing.   HENT:      Head: Normocephalic and atraumatic.      Nose: No congestion or rhinorrhea.   Eyes:      General: No scleral icterus.     Extraocular Movements: Extraocular movements intact.      Pupils: Pupils are equal, round, and reactive to light.   Cardiovascular:      Rate and Rhythm: Normal rate and regular rhythm.      Pulses: Normal pulses.      Heart sounds: Normal heart sounds. No murmur heard.     No gallop.   Pulmonary:      Effort: Pulmonary effort is normal. No respiratory distress.      Breath sounds: Normal breath sounds. No stridor. No wheezing or rhonchi.   Abdominal:      General: Bowel sounds are normal. There is no distension.      Palpations: There is no mass.      Tenderness: There is no abdominal tenderness. There is no guarding.   Musculoskeletal:         General: No swelling, tenderness, deformity or signs of injury. Normal range of motion.      Cervical back: Normal range of motion and neck supple. No rigidity. No muscular tenderness.      Right lower leg: No edema.      Left lower leg: No edema.      Comments: Status post left mastectomy and axillary lymph node evaluation.  Left arm lymphedema   Skin:     General: Skin is warm.      Coloration: Skin is not jaundiced or pale.      Findings: No bruising or lesion.   Neurological:      General: No focal deficit present.      Mental Status: She is alert and oriented to person, place, and time.      Cranial Nerves: No cranial nerve deficit.      Sensory: No sensory deficit.      Motor: No weakness.      Gait: Gait normal.   Psychiatric:         Mood and Affect: Mood normal.         Behavior: Behavior normal.         Thought Content: Thought content normal.       Vitals:    12/10/24 1320   BP: 118/66   Pulse: (!) 121   Resp: 16   Body surface area is 2.46 meters  squared.    Assessment/Plan:      ECOG 1    Invasive lobular carcinoma arising from left breast in female:    == ER/DE positive HER2 Roland negative.  Status post left radical mastectomy and axillary lymph node dissection  == p T3 pN1aM0, Stage IIA  == Patient would likely need adjuvant chemotherapy considering overall size and stage.  I discussed with her about NCCN guidelines regarding invasive lobular carcinoma which has better prognosis than other types.  Will recommend Oncotype DX on tumor specimen at this time and based on recurrence score risk will discuss adjuvant chemotherapy once the results are back.    == 9/5/24:  DX revealed a low recurrence risk score of 10 with absolute group average chemotherapy benefit of <1%.  Due to this no chemotherapy will be offered and patient can proceed with radiation if inidcated.  Post radiation we will place patient on hormone blockers.  == 10/15/24:  DEXA bone density showed no evidence of osteopenia.  Patient has not completed her radiation yet but has been evaluated by Radiation Oncology.  Post radiation patient will be placed on hormone blockers with letrozole.  Advised to take calcium and vitamin-D.  Also noted to have lymphedema in the left extremity referral to OT placed  == 12/10/24: Currently on XRT which she completes in few days. Will start patient on Letrozole. Advised to take calcium and Vit D.     Plan:  Start Letrozole. Advised to take calcium and Vit D    Future Appointments   Date Time Provider Department Center   1/17/2025  9:30 AM [facility] [address]   1/21/2025  1:40 PM [facility] [address]           Return to clinic in 6 weeks for MD visit with CBC with diff and CMP prior    Total time spent in counseling and discussion about further management options including relevant lab work, treatment,  prognosis, medications and intended side effects was more than 35 minutes. More than 50% of the time was spent on  counseling and coordination of care.  I spent a total of 35 minutes on the day of the visit.This includes face to face time and non-face to face time preparing to see the patient (eg, review of tests), Obtaining and/or reviewing separately obtained history, Documenting clinical information in the electronic or other health record, Independently interpreting resultsand communicating results to the patient/family/caregiver, or Care coordination.

## 2025-02-06 LAB
ALBUMIN SERPL BCP-MCNC: 3.2 G/DL (ref 3.4–5)
ALBUMIN/GLOBULIN RATIO: 0.97 RATIO (ref 1.1–1.8)
ALP SERPL-CCNC: 98 U/L (ref 46–116)
ALT SERPL W P-5'-P-CCNC: 26 U/L (ref 12–78)
ANION GAP SERPL CALC-SCNC: 6 MMOL/L (ref 3–11)
AST SERPL-CCNC: 17 U/L (ref 15–37)
BASOPHILS NFR BLD: 0.4 % (ref 0–3)
BILIRUB SERPL-MCNC: 0.3 MG/DL (ref 0–1)
BUN SERPL-MCNC: 22 MG/DL (ref 7–18)
BUN/CREAT SERPL: 22.68 RATIO (ref 7–18)
CALCIUM SERPL-MCNC: 9.3 MG/DL (ref 8.8–10.5)
CHLORIDE SERPL-SCNC: 104 MMOL/L (ref 100–108)
CO2 SERPL-SCNC: 35 MMOL/L (ref 21–32)
CREAT SERPL-MCNC: 0.97 MG/DL (ref 0.55–1.02)
EOSINOPHIL NFR BLD: 1.1 % (ref 1–3)
ERYTHROCYTE [DISTWIDTH] IN BLOOD BY AUTOMATED COUNT: 15.1 % (ref 12.5–18)
GFR ESTIMATION: 66
GLOBULIN: 3.3 G/DL (ref 2.3–3.5)
GLUCOSE SERPL-MCNC: 172 MG/DL (ref 70–110)
HCT VFR BLD AUTO: 41.3 % (ref 37–47)
HGB BLD-MCNC: 12.9 G/DL (ref 12–16)
LYMPHOCYTES NFR BLD: 8.8 % (ref 25–40)
MCH RBC QN AUTO: 27 PG (ref 27–31.2)
MCHC RBC AUTO-ENTMCNC: 31.2 G/DL (ref 31.8–35.4)
MCV RBC AUTO: 86.6 FL (ref 80–97)
MONOCYTES NFR BLD: 5.5 % (ref 1–15)
NEUTROPHILS # BLD AUTO: 7.99 10*3/UL (ref 1.8–7.7)
NEUTROPHILS NFR BLD: 83.6 % (ref 37–80)
NUCLEATED RED BLOOD CELLS: 0 %
PLATELETS: 279 10*3/UL (ref 142–424)
POTASSIUM SERPL-SCNC: 4.2 MMOL/L (ref 3.6–5.2)
PROT SERPL-MCNC: 6.5 G/DL (ref 6.4–8.2)
RBC # BLD AUTO: 4.77 10*6/UL (ref 4.2–5.4)
SODIUM BLD-SCNC: 145 MMOL/L (ref 135–145)
WBC # BLD: 9.6 10*3/UL (ref 4.6–10.2)

## 2025-02-13 ENCOUNTER — OFFICE VISIT (OUTPATIENT)
Dept: HEMATOLOGY/ONCOLOGY | Facility: CLINIC | Age: 60
End: 2025-02-13
Payer: COMMERCIAL

## 2025-02-13 VITALS
SYSTOLIC BLOOD PRESSURE: 130 MMHG | HEIGHT: 64 IN | OXYGEN SATURATION: 88 % | DIASTOLIC BLOOD PRESSURE: 81 MMHG | RESPIRATION RATE: 16 BRPM | WEIGHT: 293 LBS | BODY MASS INDEX: 50.02 KG/M2 | HEART RATE: 120 BPM

## 2025-02-13 DIAGNOSIS — Z17.0 MALIGNANT NEOPLASM OF OVERLAPPING SITES OF LEFT BREAST IN FEMALE, ESTROGEN RECEPTOR POSITIVE: Primary | ICD-10-CM

## 2025-02-13 DIAGNOSIS — C50.812 MALIGNANT NEOPLASM OF OVERLAPPING SITES OF LEFT BREAST IN FEMALE, ESTROGEN RECEPTOR POSITIVE: Primary | ICD-10-CM

## 2025-02-13 NOTE — PROGRESS NOTES
MEDICAL ONCOLOGY FOLLOW UP CONSULTATION NOTE    Patient ID: Loretta Thomas is a 60 y.o. female.    Chief Complaint: Breast Cancer    HPI:  Patient is a 59-year-old female with past medical history of bipolar disorder, schizophrenia who recently felt a left breast and armpit soreness and underwent evaluation followed by imaging which showed concern for a suspicious mass.  Patient underwent biopsy of the mass which showed findings consistent with invasive lobular cancer.  She was evaluated by surgery and underwent left modified radical mastectomy with axillary lymph node evaluation which showed findings as below.  Please see detailed pathology reports below. Patient reports skin wound in the process of healing but has been placed on Bactrim at this time which she is taking with improvement in her wound.  She presents to our clinic today for further evaluation.  Voices no acute complaints    Pathology:  07/11/2024    B.  Left axillary sentinel lymph node:    Positive for metastatic carcinoma (2/2)    Number of lymph nodes with tumor: 2  Number of lymph nodes with macro metastasis: 2      C.  Left breast:  Invasive lobular carcinoma, grade 1, resection of  Metastatic carcinoma to 1 of 13 lymph node  Phenotype: Luminal B like    Mitotic rate: Score 1 of 3  Overall grade:  Grade 1 of 3 (score 5 of 9)  Nuclear pleomorphism: Score 1 of 3  Tumor size: 8.9 x 7.9 x 3.2 cm  Tumor focality: Single focus of invasive carcinoma    Pathologic staging: PT3 pN1a    HER2 Roland negative  ER positive  MD positive  Ki 67:16%  P 53: 6%            Past Medical History:   Diagnosis Date    Allergy     Anxiety     Arthritis     Bipolar disorder, unspecified     Breast cancer     Depression     GERD (gastroesophageal reflux disease)     History of left mastectomy 07/11/2024    Schizophrenia, unspecified     Seizures     Skin cancer      Family History   Problem Relation Name Age of Onset    No Known Problems Mother      No Known Problems Father       Hyperlipidemia Paternal Grandmother       Social History     Socioeconomic History    Marital status:    Tobacco Use    Smoking status: Never    Smokeless tobacco: Never   Substance and Sexual Activity    Alcohol use: Never    Drug use: Never     Past Surgical History:   Procedure Laterality Date    DILATION AND CURETTAGE OF UTERUS      fibroid removal      MASTECTOMY Left 07/11/2024    SURGICAL REMOVAL OF ENDOMETRIOSIS           Review of systems:  Review of Systems   Constitutional:  Negative for activity change, appetite change, chills, diaphoresis, fatigue and unexpected weight change.   HENT:  Negative for congestion, facial swelling, hearing loss, mouth sores, trouble swallowing and voice change.    Eyes:  Negative for photophobia, pain, discharge and itching.   Respiratory:  Negative for apnea, cough, choking, chest tightness and shortness of breath.    Cardiovascular:  Negative for chest pain, palpitations and leg swelling.   Gastrointestinal:  Negative for abdominal distention, abdominal pain, anal bleeding and blood in stool.   Endocrine: Negative for cold intolerance, heat intolerance, polydipsia and polyphagia.   Genitourinary:  Negative for difficulty urinating, dysuria, flank pain and hematuria.   Musculoskeletal:  Negative for arthralgias, back pain, joint swelling, myalgias, neck pain and neck stiffness.        Positive for breast mas.  Status post left mastectomy: See HPI   Skin:  Negative for color change, pallor and wound.   Allergic/Immunologic: Negative for environmental allergies, food allergies and immunocompromised state.   Neurological:  Negative for dizziness, seizures, facial asymmetry, speech difficulty, light-headedness, numbness and headaches.   Hematological:  Negative for adenopathy. Does not bruise/bleed easily.   Psychiatric/Behavioral:  Negative for agitation, behavioral problems, confusion, decreased concentration and sleep disturbance.                  Physical  Exam  Vitals and nursing note reviewed.   Constitutional:       General: She is not in acute distress.     Appearance: Normal appearance. She is not ill-appearing.   HENT:      Head: Normocephalic and atraumatic.      Nose: No congestion or rhinorrhea.   Eyes:      General: No scleral icterus.     Extraocular Movements: Extraocular movements intact.      Pupils: Pupils are equal, round, and reactive to light.   Cardiovascular:      Rate and Rhythm: Normal rate and regular rhythm.      Pulses: Normal pulses.      Heart sounds: Normal heart sounds. No murmur heard.     No gallop.   Pulmonary:      Effort: Pulmonary effort is normal. No respiratory distress.      Breath sounds: Normal breath sounds. No stridor. No wheezing or rhonchi.   Abdominal:      General: Bowel sounds are normal. There is no distension.      Palpations: There is no mass.      Tenderness: There is no abdominal tenderness. There is no guarding.   Musculoskeletal:         General: No swelling, tenderness, deformity or signs of injury. Normal range of motion.      Cervical back: Normal range of motion and neck supple. No rigidity. No muscular tenderness.      Right lower leg: No edema.      Left lower leg: No edema.      Comments: Status post left mastectomy and axillary lymph node evaluation.  Left arm lymphedema   Skin:     General: Skin is warm.      Coloration: Skin is not jaundiced or pale.      Findings: No bruising or lesion.   Neurological:      General: No focal deficit present.      Mental Status: She is alert and oriented to person, place, and time.      Cranial Nerves: No cranial nerve deficit.      Sensory: No sensory deficit.      Motor: No weakness.      Gait: Gait normal.   Psychiatric:         Mood and Affect: Mood normal.         Behavior: Behavior normal.         Thought Content: Thought content normal.       Vitals:    02/13/25 0950   BP: 130/81   Pulse: (!) 120   Resp: 16   Body surface area is 2.49 meters  squared.    Assessment/Plan:      ECOG 1    Invasive lobular carcinoma arising from left breast in female:    == ER/MI positive HER2 Roland negative.  Status post left radical mastectomy and axillary lymph node dissection  == p T3 pN1aM0, Stage IIA  == Patient would likely need adjuvant chemotherapy considering overall size and stage.  I discussed with her about NCCN guidelines regarding invasive lobular carcinoma which has better prognosis than other types.  Will recommend Oncotype DX on tumor specimen at this time and based on recurrence score risk will discuss adjuvant chemotherapy once the results are back.    == 9/5/24:  DX revealed a low recurrence risk score of 10 with absolute group average chemotherapy benefit of <1%.  Due to this no chemotherapy will be offered and patient can proceed with radiation if inidcated.  Post radiation we will place patient on hormone blockers.  == 10/15/24:  DEXA bone density showed no evidence of osteopenia.  Patient has not completed her radiation yet but has been evaluated by Radiation Oncology.  Post radiation patient will be placed on hormone blockers with letrozole.  Advised to take calcium and vitamin-D.  Also noted to have lymphedema in the left extremity referral to OT placed  == 12/10/24: Currently on XRT which she completes in few days. Will start patient on Letrozole. Advised to take calcium and Vit D.   == 2/13/25:  Patient reports complaints of hot flashes for which she takes vitamin-E at night.  She is also on different psychiatry medications which is causing her to gain weight.  Advised her to take vitamin-E twice to 3 times daily at this time.  She is already on various psychiatry medications          Plan:  Continue Letrozole. Advised to take calcium and Vit D  Take vitamin-E b.i.d for hot flashes  Annual mammogram    Return to clinic in 3 months for MD visit with CBC with diff, CMP prior      Future Appointments   Date Time Provider Department Center   5/12/2025   2:00 PM LAB TESTING, [facility] [address]   5/13/2025  3:20 PM [facility] [address]             Return to clinic in 6 weeks for MD visit with CBC with diff and CMP prior    Total time spent in counseling and discussion about further management options including relevant lab work, treatment,  prognosis, medications and intended side effects was more than 45 minutes. More than 50% of the time was spent on counseling and coordination of care.  I spent a total of 45 minutes on the day of the visit.This includes face to face time and non-face to face time preparing to see the patient (eg, review of tests), Obtaining and/or reviewing separately obtained history, Documenting clinical information in the electronic or other health record, Independently interpreting resultsand communicating results to the patient/family/caregiver, or Care coordination.

## 2025-05-13 ENCOUNTER — TELEPHONE (OUTPATIENT)
Dept: HEMATOLOGY/ONCOLOGY | Facility: CLINIC | Age: 60
End: 2025-05-13

## 2025-05-13 ENCOUNTER — OFFICE VISIT (OUTPATIENT)
Dept: HEMATOLOGY/ONCOLOGY | Facility: CLINIC | Age: 60
End: 2025-05-13
Payer: COMMERCIAL

## 2025-05-13 VITALS
RESPIRATION RATE: 16 BRPM | WEIGHT: 293 LBS | HEIGHT: 64 IN | SYSTOLIC BLOOD PRESSURE: 113 MMHG | BODY MASS INDEX: 50.02 KG/M2 | OXYGEN SATURATION: 91 % | DIASTOLIC BLOOD PRESSURE: 76 MMHG | HEART RATE: 115 BPM

## 2025-05-13 DIAGNOSIS — Z17.0 MALIGNANT NEOPLASM OF OVERLAPPING SITES OF LEFT BREAST IN FEMALE, ESTROGEN RECEPTOR POSITIVE: Primary | ICD-10-CM

## 2025-05-13 DIAGNOSIS — C50.911 LOBULAR CARCINOMA OF RIGHT BREAST: ICD-10-CM

## 2025-05-13 DIAGNOSIS — C50.812 MALIGNANT NEOPLASM OF OVERLAPPING SITES OF LEFT BREAST IN FEMALE, ESTROGEN RECEPTOR POSITIVE: Primary | ICD-10-CM

## 2025-05-13 DIAGNOSIS — Z79.811 LONG TERM (CURRENT) USE OF AROMATASE INHIBITORS: ICD-10-CM

## 2025-05-13 PROCEDURE — G2211 COMPLEX E/M VISIT ADD ON: HCPCS | Mod: S$PBB,,, | Performed by: NURSE PRACTITIONER

## 2025-05-13 PROCEDURE — 99214 OFFICE O/P EST MOD 30 MIN: CPT | Mod: S$PBB,,, | Performed by: NURSE PRACTITIONER

## 2025-05-13 RX ORDER — FEZOLINETANT 45 MG/1
45 TABLET, FILM COATED ORAL DAILY
Qty: 30 TABLET | Refills: 11 | Status: SHIPPED | OUTPATIENT
Start: 2025-05-13

## 2025-05-13 NOTE — PROGRESS NOTES
MEDICAL ONCOLOGY FOLLOW UP CONSULTATION NOTE    Patient ID: Loretta Thomas is a 60 y.o. female.    Chief Complaint: Breast Cancer    HPI:  Patient is a 59-year-old female with past medical history of bipolar disorder, schizophrenia who recently felt a left breast and armpit soreness and underwent evaluation followed by imaging which showed concern for a suspicious mass.  Patient underwent biopsy of the mass which showed findings consistent with invasive lobular cancer.  She was evaluated by surgery and underwent left modified radical mastectomy with axillary lymph node evaluation which showed findings as below.  Please see detailed pathology reports below. Patient reports skin wound in the process of healing but has been placed on Bactrim at this time which she is taking with improvement in her wound.  She presents to our clinic today for further evaluation.  Voices no acute complaints    Pathology:  07/11/2024    B.  Left axillary sentinel lymph node:    Positive for metastatic carcinoma (2/2)    Number of lymph nodes with tumor: 2  Number of lymph nodes with macro metastasis: 2      C.  Left breast:  Invasive lobular carcinoma, grade 1, resection of  Metastatic carcinoma to 1 of 13 lymph node  Phenotype: Luminal B like    Mitotic rate: Score 1 of 3  Overall grade:  Grade 1 of 3 (score 5 of 9)  Nuclear pleomorphism: Score 1 of 3  Tumor size: 8.9 x 7.9 x 3.2 cm  Tumor focality: Single focus of invasive carcinoma    Pathologic staging: PT3 pN1a    HER2 Roland negative  ER positive  MA positive  Ki 67:16%  P 53: 6%            Past Medical History:   Diagnosis Date    Allergy     Anxiety     Arthritis     Bipolar disorder, unspecified     Breast cancer     Depression     GERD (gastroesophageal reflux disease)     History of left mastectomy 07/11/2024    Schizophrenia, unspecified     Seizures     Skin cancer      Family History   Problem Relation Name Age of Onset    No Known Problems Mother      No Known Problems Father       Hyperlipidemia Paternal Grandmother       Social History     Socioeconomic History    Marital status:    Tobacco Use    Smoking status: Never    Smokeless tobacco: Never   Substance and Sexual Activity    Alcohol use: Never    Drug use: Never     Social Drivers of Health     Food Insecurity: No Food Insecurity (4/3/2025)    Received from [facility] Vital Sign     Worried About Running Out of Food in the Last Year: Never true     Ran Out of Food in the Last Year: Never true     Past Surgical History:   Procedure Laterality Date    DILATION AND CURETTAGE OF UTERUS      fibroid removal      MASTECTOMY Left 07/11/2024    SURGICAL REMOVAL OF ENDOMETRIOSIS           Review of systems:  Review of Systems   Constitutional:  Negative for activity change, appetite change, chills, diaphoresis, fatigue and unexpected weight change.   HENT:  Negative for congestion, facial swelling, hearing loss, mouth sores, trouble swallowing and voice change.    Eyes:  Negative for photophobia, pain, discharge and itching.   Respiratory:  Negative for apnea, cough, choking, chest tightness and shortness of breath.    Cardiovascular:  Negative for chest pain, palpitations and leg swelling.   Gastrointestinal:  Negative for abdominal distention, abdominal pain, anal bleeding and blood in stool.   Endocrine: Negative for cold intolerance, heat intolerance, polydipsia and polyphagia.   Genitourinary:  Negative for difficulty urinating, dysuria, flank pain and hematuria.   Musculoskeletal:  Negative for arthralgias, back pain, joint swelling, myalgias, neck pain and neck stiffness.        Positive for breast mas.  Status post left mastectomy: See HPI   Skin:  Negative for color change, pallor and wound.   Allergic/Immunologic: Negative for environmental allergies, food allergies and immunocompromised state.   Neurological:  Negative for dizziness, seizures, facial asymmetry, speech difficulty, light-headedness, numbness  and headaches.   Hematological:  Negative for adenopathy. Does not bruise/bleed easily.   Psychiatric/Behavioral:  Negative for agitation, behavioral problems, confusion, decreased concentration and sleep disturbance.                  Physical Exam  Vitals and nursing note reviewed.   Constitutional:       General: She is not in acute distress.     Appearance: Normal appearance. She is not ill-appearing.   HENT:      Head: Normocephalic and atraumatic.      Nose: No congestion or rhinorrhea.   Eyes:      General: No scleral icterus.     Extraocular Movements: Extraocular movements intact.      Pupils: Pupils are equal, round, and reactive to light.   Cardiovascular:      Rate and Rhythm: Normal rate and regular rhythm.      Pulses: Normal pulses.      Heart sounds: Normal heart sounds. No murmur heard.     No gallop.   Pulmonary:      Effort: Pulmonary effort is normal. No respiratory distress.      Breath sounds: Normal breath sounds. No stridor. No wheezing or rhonchi.   Abdominal:      General: Bowel sounds are normal. There is no distension.      Palpations: There is no mass.      Tenderness: There is no abdominal tenderness. There is no guarding.   Musculoskeletal:         General: No swelling, tenderness, deformity or signs of injury. Normal range of motion.      Cervical back: Normal range of motion and neck supple. No rigidity. No muscular tenderness.      Right lower leg: No edema.      Left lower leg: No edema.      Comments: Status post left mastectomy and axillary lymph node evaluation.  Left arm lymphedema   Skin:     General: Skin is warm.      Coloration: Skin is not jaundiced or pale.      Findings: No bruising or lesion.   Neurological:      General: No focal deficit present.      Mental Status: She is alert and oriented to person, place, and time.      Cranial Nerves: No cranial nerve deficit.      Sensory: No sensory deficit.      Motor: No weakness.      Gait: Gait normal.   Psychiatric:          Mood and Affect: Mood normal.         Behavior: Behavior normal.         Thought Content: Thought content normal.     There were no vitals filed for this visit.  There is no height or weight on file to calculate BSA.    Assessment/Plan:      ECOG 1    Invasive lobular carcinoma arising from left breast in female:    == ER/DE positive HER2 Roland negative.  Status post left radical mastectomy and axillary lymph node dissection  == p T3 pN1aM0, Stage IIA  == Patient would likely need adjuvant chemotherapy considering overall size and stage.  I discussed with her about NCCN guidelines regarding invasive lobular carcinoma which has better prognosis than other types.  Will recommend Oncotype DX on tumor specimen at this time and based on recurrence score risk will discuss adjuvant chemotherapy once the results are back.    == 9/5/24:  DX revealed a low recurrence risk score of 10 with absolute group average chemotherapy benefit of <1%.  Due to this no chemotherapy will be offered and patient can proceed with radiation if inidcated.  Post radiation we will place patient on hormone blockers.  == 10/15/24:  DEXA bone density showed no evidence of osteopenia.  Patient has not completed her radiation yet but has been evaluated by Radiation Oncology.  Post radiation patient will be placed on hormone blockers with letrozole.  Advised to take calcium and vitamin-D.  Also noted to have lymphedema in the left extremity referral to OT placed  == 12/10/24: Currently on XRT which she completes in few days. Will start patient on Letrozole. Advised to take calcium and Vit D.   == 2/13/25:  Patient reports complaints of hot flashes for which she takes vitamin-E at night.  She is also on different psychiatry medications which is causing her to gain weight.  Advised her to take vitamin-E twice to 3 times daily at this time.  She is already on various psychiatry medications  ==05/13/2025: Pt on letrazole as well as calcium and vitamin D, will  be due for left mammo screening in December, had left mastectomy, last dexa was 10/9/24 which showed normal bone density. Pt continues to have hot flashes, will start Veozah and monitor lfts q 3 months          Plan:  Continue Letrozole. Advised to take calcium and Vit D  Take vitamin-E b.i.d for hot flashes  Annual mammogram    Return to clinic in 3 months for MD visit with CBC with diff, CMP prior      No future appointments.            Return to clinic in 6 weeks for MD visit with CBC with diff and CMP prior    Total time spent in counseling and discussion about further management options including relevant lab work, treatment,  prognosis, medications and intended side effects was more than 45 minutes. More than 50% of the time was spent on counseling and coordination of care.  I spent a total of 45 minutes on the day of the visit.This includes face to face time and non-face to face time preparing to see the patient (eg, review of tests), Obtaining and/or reviewing separately obtained history, Documenting clinical information in the electronic or other health record, Independently interpreting resultsand communicating results to the patient/family/caregiver, or Care coordination.

## 2025-05-13 NOTE — TELEPHONE ENCOUNTER
Called pt due to receiving a prescription rejection on the medication Veozah 45MG TAB ASTE. Upon speaking to Basilia Ward NP she stated to tell the patient it was rejected and for her to try the website she gave her for patient assistance. Patient gave verbal understanding.

## 2025-06-10 ENCOUNTER — TELEPHONE (OUTPATIENT)
Dept: HEMATOLOGY/ONCOLOGY | Facility: CLINIC | Age: 60
End: 2025-06-10
Payer: COMMERCIAL

## 2025-06-10 NOTE — TELEPHONE ENCOUNTER
Copied from CRM #[id number]. Topic: Medications - Medication Question  >> Jun 9, 2025  2:58 PM Chiquis wrote:  ..Type:  Patient Requesting Call    Who Called:Loretta Thomas  Would the patient rather a call back or a response via [facility]? Call  Best Call Back Number:.[phone]   Additional Information: Patient called to discuss directions for fezolinetant (VEOZAH) 45 mg Tab

## 2025-07-09 ENCOUNTER — TELEPHONE (OUTPATIENT)
Dept: HEMATOLOGY/ONCOLOGY | Facility: CLINIC | Age: 60
End: 2025-07-09
Payer: COMMERCIAL

## 2025-07-09 NOTE — TELEPHONE ENCOUNTER
Called pt to see if she picked Veozah 45 MG from [facility] on [address]. Asked for a return call.